# Patient Record
Sex: MALE | Race: WHITE
[De-identification: names, ages, dates, MRNs, and addresses within clinical notes are randomized per-mention and may not be internally consistent; named-entity substitution may affect disease eponyms.]

---

## 2018-05-11 ENCOUNTER — HOSPITAL ENCOUNTER (EMERGENCY)
Dept: HOSPITAL 89 - ER | Age: 23
Discharge: HOME | End: 2018-05-11
Payer: COMMERCIAL

## 2018-05-11 VITALS — SYSTOLIC BLOOD PRESSURE: 129 MMHG | DIASTOLIC BLOOD PRESSURE: 98 MMHG

## 2018-05-11 DIAGNOSIS — S09.90XA: ICD-10-CM

## 2018-05-11 DIAGNOSIS — T63.301A: ICD-10-CM

## 2018-05-11 DIAGNOSIS — R55: Primary | ICD-10-CM

## 2018-05-11 PROCEDURE — 99282 EMERGENCY DEPT VISIT SF MDM: CPT

## 2018-05-11 NOTE — ER REPORT
History and Physical


Time Seen By MD:  00:48


HPI/ROS


CHIEF COMPLAINT: Syncope, spider bite





HISTORY OF PRESENT ILLNESS: 22-year-old male presents with syncope at home 

after using marijuana.  Patient sustained a spider bite 36 hours prior on his 

right lower extremity.  He has a small area of erythema approximately 3 cm in 

diameter with a central puncture wound.  There are no red streaks noted 

superior.  Patient denies systemic symptoms from the spider bite.  Patient fell 

striking his head.  He has a small hematoma to the right frontal forehead near 

the scalp line.





REVIEW OF SYSTEMS:


Respiratory: No cough, no dyspnea.


Cardiovascular: No chest pain, no palpitations.


Gastrointestinal: No vomiting, no abdominal pain.


Musculoskeletal: No back pain.


Allergies:  


Coded Allergies:  


     No Known Drug Allergies (Unverified , 5/11/18)


Home Meds


No Active Prescriptions or Reported Meds


Reviewed Nurses Notes:  Yes


Old Medical Records Reviewed:  Yes


Constitutional





Vital Sign - Last 24 Hours








 5/11/18





 00:46


 


Temp 98.5


 


Pulse 103


 


Resp 14


 


B/P (MAP) 129/98


 


Pulse Ox 100








Physical Exam


Vital signs stable, afebrile, pulse ox normal


General Appearance: The patient is alert, has no immediate need for airway 

protection and no current signs of toxicity.  Vital signs stable, afebrile, 

pulse ox normal


HEENT: Pupils equal and round no injection.  TMs normal, oropharynx without 

dental trauma, close examination of the upper right forehead shows a very tiny 

contusion.


Respiratory: Chest is non tender, lungs are clear to auscultation.


Cardiac: regular rate and rhythm


Gastrointestinal: Abdomen is soft and non tender, no masses, bowel sounds 

normal.


Musculoskeletal:  Neck: Neck is supple and non tender.  No tenderness in the 

midline


Extremities have full range of motion and are non tender.  No evidence of trauma

, examination of the right lower posterior calf shows a 23 centimeter area of 

very mild erythema with a central scab.  There is no warmth to the area.  There 

are no lymphangitis 


Skin: No rashes or lesions.





DIFFERENTIAL DIAGNOSIS: After history and physical exam differential diagnosis 

was considered for syncope including but not limited to vasovagal syncope, 

arrhythmia, dehydration, and blood loss.  Additionally,head injury including 

but not limited to concussion, skull fracture, intraparenchymal contusion, 

subarachnoid, subdural and epidural hematoma.





Medical Decision Making


ED Course/Re-evaluation


ED Course


Patient was admitted to an examination room.  H&P was done.  The differential 

diagnoses was considered.  On clinical examination.  Patient has stable vital 

signs.  He is a nonfocal neurologic examination.  He has a small contusion in 

the right forehead.  He is no tenderness on palpation of cervical spine.  

Examination of patient's right lower extremity shows a small area of erythema 

around the insect bite.  There is no evidence of infection.  There is no 

warmth.  There is no purulent drainage.  Patient's advised a conservative 

treatment plan of form compresses, ibuprofen for treatment of the insect bite.  

Patient's advised to keep his fluid intake for the vasovagal syncope suffered.  

Patient has a mild head injury, head injury instructions were reviewed.


Decision to Disposition Date:  May 11, 2018


Decision to Disposition Time:  01:03





Depart


Departure


Latest Vital Signs





Vital Signs








  Date Time  Temp Pulse Resp B/P (MAP) Pulse Ox O2 Delivery O2 Flow Rate FiO2


 


5/11/18 00:46 98.5 103 14 129/98 100   








Impression:  


 Primary Impression:  


 Syncope


 Additional Impressions:  


 Head injury


 Spider bite


Condition:  Improved


Disposition:  HOME OR SELF-CARE


Referrals:  


ELIZABETH BUNCH MD


New Scripts


No Active Prescriptions or Reported Meds


Patient Instructions:  Head Injury (ED), Insect Bite or Sting (ED), Syncope (ED)





Additional Instructions:  


Take ibuprofen 200 mg 3 tablets 3 times a day with food


Apply warm compresses to the affected area


Follow-up with primary care if unimproved in 3-5 days.





Problem Qualifiers








 Primary Impression:  


 Syncope


 Syncope type:  vasovagal syncope  Qualified Codes:  R55 - Syncope and collapse


 Additional Impressions:  


 Head injury


 Encounter type:  initial encounter  Qualified Codes:  S09.90XA - Unspecified 

injury of head, initial encounter


 Spider bite


 Encounter type:  initial encounter  Injury intent:  accidental or 

unintentional  Qualified Codes:  T63.301A - Toxic effect of unspecified spider 

venom, accidental (unintentional), initial encounter








SAGE MENDOZA DO May 11, 2018 00:48